# Patient Record
Sex: MALE | Race: WHITE | NOT HISPANIC OR LATINO | URBAN - METROPOLITAN AREA
[De-identification: names, ages, dates, MRNs, and addresses within clinical notes are randomized per-mention and may not be internally consistent; named-entity substitution may affect disease eponyms.]

---

## 2018-03-19 ENCOUNTER — EMERGENCY (EMERGENCY)
Facility: HOSPITAL | Age: 28
LOS: 1 days | Discharge: HOME | End: 2018-03-19
Attending: EMERGENCY MEDICINE | Admitting: EMERGENCY MEDICINE
Payer: COMMERCIAL

## 2018-03-19 VITALS
TEMPERATURE: 98 F | OXYGEN SATURATION: 98 % | DIASTOLIC BLOOD PRESSURE: 77 MMHG | HEART RATE: 81 BPM | RESPIRATION RATE: 20 BRPM | SYSTOLIC BLOOD PRESSURE: 149 MMHG

## 2018-03-19 LAB
APPEARANCE UR: CLEAR — SIGNIFICANT CHANGE UP
BILIRUB UR-MCNC: NEGATIVE — SIGNIFICANT CHANGE UP
BLOOD UR QL VISUAL: NEGATIVE — SIGNIFICANT CHANGE UP
COLOR SPEC: YELLOW — SIGNIFICANT CHANGE UP
GLUCOSE UR-MCNC: NEGATIVE — SIGNIFICANT CHANGE UP
KETONES UR-MCNC: NEGATIVE — SIGNIFICANT CHANGE UP
LEUKOCYTE ESTERASE UR-ACNC: NEGATIVE — SIGNIFICANT CHANGE UP
MUCOUS THREADS # UR AUTO: SIGNIFICANT CHANGE UP
NITRITE UR-MCNC: NEGATIVE — SIGNIFICANT CHANGE UP
PH UR: 6 — SIGNIFICANT CHANGE UP (ref 4.6–8)
PROT UR-MCNC: NEGATIVE MG/DL — SIGNIFICANT CHANGE UP
RBC CASTS # UR COMP ASSIST: SIGNIFICANT CHANGE UP (ref 0–?)
SP GR SPEC: 1.02 — SIGNIFICANT CHANGE UP (ref 1–1.04)
UROBILINOGEN FLD QL: NORMAL MG/DL — SIGNIFICANT CHANGE UP
WBC UR QL: SIGNIFICANT CHANGE UP (ref 0–?)

## 2018-03-19 PROCEDURE — 76870 US EXAM SCROTUM: CPT | Mod: 26

## 2018-03-19 PROCEDURE — 99284 EMERGENCY DEPT VISIT MOD MDM: CPT

## 2018-03-19 NOTE — ED PROVIDER NOTE - MEDICAL DECISION MAKING DETAILS
5 day hx rt scrotal pain with no other acute complaints. will obtain UA, ua for STD and US testicles.

## 2018-03-19 NOTE — ED ADULT NURSE NOTE - OBJECTIVE STATEMENT
Facilitator RN:  received pt in room 24, c/o right testicular swelling and pain since Thursday. Pt reports pain/swelling began while in [place], and returned approx. 4 hours ago.  Pt went to [facility], and was told to come to ED for ultrasound.  Pt appears comfortable, ambulating without difficulty.  Pt A&Ox3, respirations even and unlabored.  Pt denies N/V/D.  Per Dr. Mcfarlane, no labs needed at present time.  Pt given two urine cups for dirty and clean samples of urine; pt verbalizes collection instructions.  Report given to primary RN Herminio

## 2018-03-19 NOTE — ED ADULT TRIAGE NOTE - CHIEF COMPLAINT QUOTE
Pt. presents to [facility] ED with right testicular swelling and pain since thursday. Started in [place]. Pt. returned 4 hours ago and went to urgicenter. Refered for ultrasound.

## 2018-03-19 NOTE — ED PROVIDER NOTE - PROGRESS NOTE DETAILS
amanda: small rt hydroceole. UA negative. [facility] urology f/u. STD ua testing pending

## 2018-03-19 NOTE — ED PROVIDER NOTE - OBJECTIVE STATEMENT
amanda: hx from pt.   5 day hx of pain to rt testicle. spontaneous onset. no hx trauma. occurred while in [place].  no dysuria or discharge. last unprotected sex was with female partner 2 weeks ago.   no similar hx pain.

## 2018-03-19 NOTE — ED ADULT NURSE NOTE - CHIEF COMPLAINT QUOTE
Pt. presents to [facility] ED with right testicular swelling and pain since thursday. Started in [place]. Pt. returned 4 hours ago and went to urgicenter. Refered for ultrasound.

## 2018-03-21 LAB
BACTERIA UR CULT: SIGNIFICANT CHANGE UP
C TRACH RRNA SPEC QL NAA+PROBE: SIGNIFICANT CHANGE UP
N GONORRHOEA RRNA SPEC QL NAA+PROBE: SIGNIFICANT CHANGE UP
SPECIMEN SOURCE: SIGNIFICANT CHANGE UP
SPECIMEN SOURCE: SIGNIFICANT CHANGE UP
